# Patient Record
Sex: FEMALE | Race: WHITE | NOT HISPANIC OR LATINO | Employment: FULL TIME | URBAN - METROPOLITAN AREA
[De-identification: names, ages, dates, MRNs, and addresses within clinical notes are randomized per-mention and may not be internally consistent; named-entity substitution may affect disease eponyms.]

---

## 2018-01-03 ENCOUNTER — GYNECOLOGY VISIT (OUTPATIENT)
Dept: OBGYN | Facility: CLINIC | Age: 53
End: 2018-01-03
Payer: COMMERCIAL

## 2018-01-03 VITALS
WEIGHT: 195 LBS | DIASTOLIC BLOOD PRESSURE: 100 MMHG | BODY MASS INDEX: 30.61 KG/M2 | SYSTOLIC BLOOD PRESSURE: 150 MMHG | HEIGHT: 67 IN

## 2018-01-03 DIAGNOSIS — Z30.432 ENCOUNTER FOR IUD REMOVAL: ICD-10-CM

## 2018-01-03 DIAGNOSIS — Z01.419 WELL WOMAN EXAM: ICD-10-CM

## 2018-01-03 PROCEDURE — 99201 PR OFFICE/OUTPT VISIT,NEW,LEVL I: CPT | Performed by: NURSE PRACTITIONER

## 2018-01-03 RX ORDER — LEVOTHYROXINE SODIUM 0.07 MG/1
100 TABLET ORAL
COMMUNITY

## 2018-01-03 RX ORDER — PRAVASTATIN SODIUM 10 MG
80 TABLET ORAL NIGHTLY
COMMUNITY

## 2018-01-03 RX ORDER — LEVOTHYROXINE SODIUM 0.03 MG/1
5 TABLET ORAL
COMMUNITY

## 2018-01-03 RX ORDER — ACYCLOVIR 800 MG/1
800 TABLET ORAL DAILY
COMMUNITY

## 2018-01-04 NOTE — PROGRESS NOTES
Gyn visit today [facility] [address]    S) Patient presents for IUD removal today. States has been in 20 years, doesn't remember what kind. Is states fully menopausal and does not need replacement contraception. States last pap 10 years ago. States never had a mammogram. Does not want additional well woman care at this time. States no hx of abnormal paps. States being followed by Dr. Cotto for thyroid, dyslipidemia. States does not normallly have elevated BP  O) /100. Cervix - strings clearly visualized. No other PE done  A) IUD in situ with strings clearly visualized  P) gyn referral placed for preauth from [facility]. Patient consented for procedure, risks/benefits of removal. I was not able to convince her about other well woman care today except for successfully convincing her of need for mammo, which was ordered. She has never had one. Return for removal.

## 2018-01-15 ENCOUNTER — HOSPITAL ENCOUNTER (OUTPATIENT)
Dept: RADIOLOGY | Facility: MEDICAL CENTER | Age: 53
End: 2018-01-15
Attending: NURSE PRACTITIONER
Payer: COMMERCIAL

## 2018-01-15 DIAGNOSIS — Z01.419 WELL WOMAN EXAM: ICD-10-CM

## 2018-01-15 PROCEDURE — 77067 SCR MAMMO BI INCL CAD: CPT

## 2018-01-19 DIAGNOSIS — R92.8 ABNORMAL MAMMOGRAM: Primary | ICD-10-CM

## 2018-01-23 ENCOUNTER — HOSPITAL ENCOUNTER (OUTPATIENT)
Dept: RADIOLOGY | Facility: MEDICAL CENTER | Age: 53
End: 2018-01-23
Attending: NURSE PRACTITIONER
Payer: COMMERCIAL

## 2018-01-23 DIAGNOSIS — R92.8 ABNORMAL MAMMOGRAM OF LEFT BREAST: ICD-10-CM

## 2018-01-23 PROCEDURE — 77065 DX MAMMO INCL CAD UNI: CPT | Mod: LT

## 2018-02-20 ENCOUNTER — HOSPITAL ENCOUNTER (OUTPATIENT)
Facility: MEDICAL CENTER | Age: 53
End: 2018-02-20
Attending: OBSTETRICS & GYNECOLOGY
Payer: COMMERCIAL

## 2018-02-20 ENCOUNTER — GYNECOLOGY VISIT (OUTPATIENT)
Dept: OBGYN | Facility: CLINIC | Age: 53
End: 2018-02-20
Payer: COMMERCIAL

## 2018-02-20 VITALS — HEIGHT: 67 IN | DIASTOLIC BLOOD PRESSURE: 74 MMHG | SYSTOLIC BLOOD PRESSURE: 120 MMHG

## 2018-02-20 DIAGNOSIS — Z11.51 SCREENING FOR HPV (HUMAN PAPILLOMAVIRUS): ICD-10-CM

## 2018-02-20 DIAGNOSIS — Z12.4 SCREENING FOR MALIGNANT NEOPLASM OF CERVIX: ICD-10-CM

## 2018-02-20 DIAGNOSIS — Z30.432 ENCOUNTER FOR IUD REMOVAL: ICD-10-CM

## 2018-02-20 PROCEDURE — 58301 REMOVE INTRAUTERINE DEVICE: CPT | Performed by: OBSTETRICS & GYNECOLOGY

## 2018-02-20 PROCEDURE — 88175 CYTOPATH C/V AUTO FLUID REDO: CPT

## 2018-02-20 PROCEDURE — 99396 PREV VISIT EST AGE 40-64: CPT | Mod: 25 | Performed by: OBSTETRICS & GYNECOLOGY

## 2018-02-20 PROCEDURE — 87624 HPV HI-RISK TYP POOLED RSLT: CPT

## 2018-02-20 ASSESSMENT — ENCOUNTER SYMPTOMS
HEMOPTYSIS: 0
PALPITATIONS: 0
VOMITING: 0
NAUSEA: 0
CHILLS: 0
ORTHOPNEA: 0
COUGH: 0
HEARTBURN: 0
FEVER: 0
DEPRESSION: 0
DIZZINESS: 0

## 2018-02-20 NOTE — PROGRESS NOTES
"Subjective:      So Wood is a 52 y.o. female who presents with Procedure  IUD removal          HPI    Review of Systems   Constitutional: Negative for chills and fever.   Respiratory: Negative for cough and hemoptysis.    Cardiovascular: Negative for chest pain, palpitations and orthopnea.   Gastrointestinal: Negative for heartburn, nausea and vomiting.   Genitourinary: Negative for dysuria and urgency.   Skin: Negative for rash.   Neurological: Negative for dizziness.   Psychiatric/Behavioral: Negative for depression.          Objective:     /74   Ht 1.702 m (5' 7\")   Breastfeeding? No      Physical Exam   Constitutional: She is oriented to person, place, and time. She appears well-developed and well-nourished.   Neck: Normal range of motion. Neck supple.   Cardiovascular: Normal rate.    Abdominal: Soft. Bowel sounds are normal.   Genitourinary: Rectal exam shows no external hemorrhoid. Pelvic exam was performed with patient supine. No labial fusion. There is no rash, tenderness, lesion or injury on the right labia. There is no rash, tenderness, lesion or injury on the left labia. Uterus is not deviated, not enlarged, not fixed and not tender. Cervix exhibits no motion tenderness, no discharge and no friability. Right adnexum displays no mass, no tenderness and no fullness. Left adnexum displays no mass, no tenderness and no fullness. No tenderness in the vagina. No signs of injury around the vagina. No vaginal discharge found.   Genitourinary Comments: IUD string was clearly noted at cervical os   Lymphadenopathy:        Right: No inguinal adenopathy present.        Left: No inguinal adenopathy present.   Neurological: She is alert and oriented to person, place, and time.   Skin: Skin is warm and dry.   Psychiatric: She has a normal mood and affect.   Nursing note and vitals reviewed.         Discussed with patient risks of IUD removal which can include infection and pain  Patient voiced " understanding of risks as described, informed consent is obtained  IUD string was noted at cervical os and grasped with ring forceps  IUD was removed in its entirety showed the patient and discarded  Patient tolerated procedure well     Assessment/Plan:     1. Encounter for IUD removal    - Consent for Surgery / Procedure    2. Screening for HPV (human papillomavirus)    - THINPREP PAP WITH HPV; Future    3. Screening for malignant neoplasm of cervix    - THINPREP PAP WITH HPV; Future

## 2018-02-21 DIAGNOSIS — Z11.51 SCREENING FOR HPV (HUMAN PAPILLOMAVIRUS): ICD-10-CM

## 2018-02-21 DIAGNOSIS — Z12.4 SCREENING FOR MALIGNANT NEOPLASM OF CERVIX: ICD-10-CM

## 2018-02-22 LAB
CYTOLOGY REG CYTOL: NORMAL
HPV HR 12 DNA CVX QL NAA+PROBE: NEGATIVE
HPV16 DNA SPEC QL NAA+PROBE: NEGATIVE
HPV18 DNA SPEC QL NAA+PROBE: NEGATIVE
SPECIMEN SOURCE: NORMAL

## 2018-07-18 ENCOUNTER — HOSPITAL ENCOUNTER (OUTPATIENT)
Dept: RADIOLOGY | Facility: MEDICAL CENTER | Age: 53
End: 2018-07-18
Attending: NURSE PRACTITIONER
Payer: COMMERCIAL

## 2018-07-18 DIAGNOSIS — R92.8 ABNORMAL FINDINGS ON DIAGNOSTIC IMAGING OF BREAST: ICD-10-CM

## 2018-07-18 PROCEDURE — G0279 TOMOSYNTHESIS, MAMMO: HCPCS | Mod: LT

## 2018-08-10 ENCOUNTER — HOSPITAL ENCOUNTER (OUTPATIENT)
Dept: RADIOLOGY | Facility: MEDICAL CENTER | Age: 53
End: 2018-08-10
Attending: NURSE PRACTITIONER
Payer: COMMERCIAL

## 2018-08-10 DIAGNOSIS — R92.8 ABNORMAL FINDINGS ON DIAGNOSTIC IMAGING OF BREAST: ICD-10-CM

## 2018-08-10 PROCEDURE — 19081 BX BREAST 1ST LESION STRTCTC: CPT

## 2018-08-10 PROCEDURE — 88305 TISSUE EXAM BY PATHOLOGIST: CPT

## 2018-08-13 ENCOUNTER — TELEPHONE (OUTPATIENT)
Dept: RADIOLOGY | Facility: MEDICAL CENTER | Age: 53
End: 2018-08-13

## 2022-08-17 ENCOUNTER — PRE-ADMISSION TESTING (OUTPATIENT)
Dept: ADMISSIONS | Facility: MEDICAL CENTER | Age: 57
DRG: 743 | End: 2022-08-17
Attending: OBSTETRICS & GYNECOLOGY
Payer: COMMERCIAL

## 2022-08-17 DIAGNOSIS — Z01.810 PRE-OPERATIVE CARDIOVASCULAR EXAMINATION: ICD-10-CM

## 2022-08-17 DIAGNOSIS — Z01.812 PRE-OPERATIVE LABORATORY EXAMINATION: ICD-10-CM

## 2022-08-17 LAB
ANION GAP SERPL CALC-SCNC: 10 MMOL/L (ref 7–16)
APPEARANCE UR: CLEAR
BACTERIA #/AREA URNS HPF: NEGATIVE /HPF
BASOPHILS # BLD AUTO: 1.1 % (ref 0–1.8)
BASOPHILS # BLD: 0.05 K/UL (ref 0–0.12)
BILIRUB UR QL STRIP.AUTO: NEGATIVE
BUN SERPL-MCNC: 16 MG/DL (ref 8–22)
CALCIUM SERPL-MCNC: 9.4 MG/DL (ref 8.5–10.5)
CHLORIDE SERPL-SCNC: 102 MMOL/L (ref 96–112)
CO2 SERPL-SCNC: 28 MMOL/L (ref 20–33)
COLOR UR: YELLOW
CREAT SERPL-MCNC: 1.03 MG/DL (ref 0.5–1.4)
EKG IMPRESSION: NORMAL
EOSINOPHIL # BLD AUTO: 0.11 K/UL (ref 0–0.51)
EOSINOPHIL NFR BLD: 2.5 % (ref 0–6.9)
EPI CELLS #/AREA URNS HPF: NEGATIVE /HPF
ERYTHROCYTE [DISTWIDTH] IN BLOOD BY AUTOMATED COUNT: 40.7 FL (ref 35.9–50)
GFR SERPLBLD CREATININE-BSD FMLA CKD-EPI: 63 ML/MIN/1.73 M 2
GLUCOSE SERPL-MCNC: 120 MG/DL (ref 65–99)
GLUCOSE UR STRIP.AUTO-MCNC: NEGATIVE MG/DL
HCT VFR BLD AUTO: 40.4 % (ref 37–47)
HGB BLD-MCNC: 13.8 G/DL (ref 12–16)
HYALINE CASTS #/AREA URNS LPF: NORMAL /LPF
IMM GRANULOCYTES # BLD AUTO: 0.02 K/UL (ref 0–0.11)
IMM GRANULOCYTES NFR BLD AUTO: 0.5 % (ref 0–0.9)
KETONES UR STRIP.AUTO-MCNC: NEGATIVE MG/DL
LEUKOCYTE ESTERASE UR QL STRIP.AUTO: ABNORMAL
LYMPHOCYTES # BLD AUTO: 1.62 K/UL (ref 1–4.8)
LYMPHOCYTES NFR BLD: 36.9 % (ref 22–41)
MCH RBC QN AUTO: 30.3 PG (ref 27–33)
MCHC RBC AUTO-ENTMCNC: 34.2 G/DL (ref 33.6–35)
MCV RBC AUTO: 88.8 FL (ref 81.4–97.8)
MICRO URNS: ABNORMAL
MONOCYTES # BLD AUTO: 0.44 K/UL (ref 0–0.85)
MONOCYTES NFR BLD AUTO: 10 % (ref 0–13.4)
NEUTROPHILS # BLD AUTO: 2.15 K/UL (ref 2–7.15)
NEUTROPHILS NFR BLD: 49 % (ref 44–72)
NITRITE UR QL STRIP.AUTO: NEGATIVE
NRBC # BLD AUTO: 0 K/UL
NRBC BLD-RTO: 0 /100 WBC
PH UR STRIP.AUTO: 6.5 [PH] (ref 5–8)
PLATELET # BLD AUTO: 193 K/UL (ref 164–446)
PMV BLD AUTO: 10.9 FL (ref 9–12.9)
POTASSIUM SERPL-SCNC: 3.4 MMOL/L (ref 3.6–5.5)
PROT UR QL STRIP: NEGATIVE MG/DL
RBC # BLD AUTO: 4.55 M/UL (ref 4.2–5.4)
RBC # URNS HPF: NORMAL /HPF
RBC UR QL AUTO: NEGATIVE
SODIUM SERPL-SCNC: 140 MMOL/L (ref 135–145)
SP GR UR STRIP.AUTO: 1.01
UROBILINOGEN UR STRIP.AUTO-MCNC: 0.2 MG/DL
WBC # BLD AUTO: 4.4 K/UL (ref 4.8–10.8)
WBC #/AREA URNS HPF: NORMAL /HPF

## 2022-08-17 PROCEDURE — 80048 BASIC METABOLIC PNL TOTAL CA: CPT

## 2022-08-17 PROCEDURE — 36415 COLL VENOUS BLD VENIPUNCTURE: CPT

## 2022-08-17 PROCEDURE — 93005 ELECTROCARDIOGRAM TRACING: CPT

## 2022-08-17 PROCEDURE — 93010 ELECTROCARDIOGRAM REPORT: CPT | Performed by: INTERNAL MEDICINE

## 2022-08-17 PROCEDURE — 81001 URINALYSIS AUTO W/SCOPE: CPT

## 2022-08-17 PROCEDURE — 85025 COMPLETE CBC W/AUTO DIFF WBC: CPT

## 2022-08-17 RX ORDER — PROGESTERONE 100 MG/1
100 CAPSULE ORAL
COMMUNITY

## 2022-08-17 RX ORDER — LOSARTAN POTASSIUM 100 MG/1
100 TABLET ORAL DAILY
COMMUNITY

## 2022-08-22 ASSESSMENT — ENCOUNTER SYMPTOMS
RESPIRATORY NEGATIVE: 1
GASTROINTESTINAL NEGATIVE: 1
CARDIOVASCULAR NEGATIVE: 1
CONSTITUTIONAL NEGATIVE: 1

## 2022-09-20 ENCOUNTER — PRE-ADMISSION TESTING (OUTPATIENT)
Dept: ADMISSIONS | Facility: MEDICAL CENTER | Age: 57
DRG: 743 | End: 2022-09-20
Attending: OBSTETRICS & GYNECOLOGY
Payer: COMMERCIAL

## 2022-10-03 NOTE — H&P
Obstetrics & Gynecology History & Physical Note         Date of Service    2022         Chief Complaint    Scheduled for TLH BSO possible lysis of adhesions, posible cystectomy,treatment of endometrisois and all other indictaed procedures.here for pre-op          History of Presenting Illness    So Wood is a 56 y.o.   PMB.      She is being admitted for rescheduled surgery.    Scheduled for TLH BSO possible lysis of adhesions, posible cystectomy,treatment of endometrisois and all other indictaed procedures.         There are no problems to display for this patient.              Obstetric History                        OB History            Para     Term          AB     Living       4     1                 2             SAB     IAB     Ectopic     Molar     Multiple     Live Births             2                       1               #     Outcome     Date     GA     Lbr Chuck/2nd     Weight     Sex     Delivery     Anes     PTL     Lv       4     Para                                                             3     IAB                                                             2     IAB                                                             1                                        Vag-Spont                                   Gynecologic History    Menopausal On and OFF PMB.         Review of Systems    Review of Systems     Constitutional: Negative.      Respiratory: Negative.       Cardiovascular: Negative.      Gastrointestinal: Negative.           Medical History     has a past medical history of Arthritis (2022), High cholesterol, Hyperlipidemia, Hypertension, Sleep apnea, and Thyroid disease.         She has no past medical history of Blood transfusion without reported diagnosis.         Surgical History     has a past surgical history that includes us-needle core bx-breast panel.          Family History    family history includes Hyperlipidemia in her  father and mother; Hypertension in her father and mother; Stroke in her mother.          Social History     reports that she quit smoking about 26 years ago. Her smoking use included cigarettes. She has never used smokeless tobacco. She reports current alcohol use. She reports that she does not use drugs.         Allergies    No Known Allergies         Medications      Cannot display prior to admission medications because the patient has not been admitted in this contact.                 Physical Exam    Vitals                                                                              General:       no acute distress, alert, cooperative, no distress       Skin:       normal       HEENT:      PERRLA and extraocular movements intact       Lungs:       CTA bilateral, clear to auscultation bilaterally       Heart:       S1, S2 normal, no murmur, click, rub or gallop, regular rate and rhythm, regular rate and rhythm       Breasts:       self-exam is taught and encouraged, deferred       Abdomen:      Abdomen soft, non-tender; gravid., Abdomen soft, non-tender. BS normal. No masses,  No organomegaly       Pelvis:     Exam deferred.                                                                                                                                                                        Laboratory:    OB Results                                                                                                                                                                                                                                                                                                                                                                                                                                                                                                                                                                                                                                                                                                                                                                                                                                                                                                                                                                                                                                                                                                                                                                                                                                                                                                                                                                                                                                                                                                                                                                                                        Assessment:     57 y/o G 3P 1021           x 1          adopted another son.         PMB /PCB. on and off.          pelvic pain and discomfort on and off.         pelvic ultrasound- multiple intramural fibroids.          chocolate cyst on ovary 3.5cms. postmenopausal.          EMB- lat[place]           discussed options for AUB / Fibroids and possible endometriosis.          TLH and BSO lysis of adhesions and treatment of endometriosis.          pt wants to go ahed with surgery          explained the procedure and risks and benefits,          no gaurantee of pain relief after surgery.          Plan:    Scheduled for TLH BSO possible lysis of adhesions, posible cystectomy,treatment of endometrisois and all other indictaed procedures.         Discussed with the patient indications for TLH and BSO  . The patient voiced understanding of indications for surgery  at this time.     Discussed with the patient the risks of Surgery . The risks include infection, bleeding, scarring, damage to other organs  in the area of operation. Specifically organs that can be damaged are bowel, bladder, ureters. I also discussed with the patient the risk of wound infection and wound breakdown. We discussed that these risks are greater in people with high risk factors I also discussed the risk of emergency blood transfusion during procedure .     Patient had the opportunity to ask questions regarding procedures. All questions answered to the patient's satisfaction.     Proceed with Surgery      Preopinstructions and post op recovery explained.

## 2022-10-11 ENCOUNTER — ANESTHESIA EVENT (OUTPATIENT)
Dept: SURGERY | Facility: MEDICAL CENTER | Age: 57
DRG: 743 | End: 2022-10-11
Payer: COMMERCIAL

## 2022-10-12 ENCOUNTER — HOSPITAL ENCOUNTER (INPATIENT)
Facility: MEDICAL CENTER | Age: 57
LOS: 1 days | DRG: 743 | End: 2022-10-13
Attending: OBSTETRICS & GYNECOLOGY | Admitting: OBSTETRICS & GYNECOLOGY
Payer: COMMERCIAL

## 2022-10-12 ENCOUNTER — ANESTHESIA (OUTPATIENT)
Dept: SURGERY | Facility: MEDICAL CENTER | Age: 57
DRG: 743 | End: 2022-10-12
Payer: COMMERCIAL

## 2022-10-12 DIAGNOSIS — Z98.890 POST-OPERATIVE STATE: ICD-10-CM

## 2022-10-12 PROCEDURE — 700102 HCHG RX REV CODE 250 W/ 637 OVERRIDE(OP): Performed by: STUDENT IN AN ORGANIZED HEALTH CARE EDUCATION/TRAINING PROGRAM

## 2022-10-12 PROCEDURE — 160041 HCHG SURGERY MINUTES - EA ADDL 1 MIN LEVEL 4: Performed by: OBSTETRICS & GYNECOLOGY

## 2022-10-12 PROCEDURE — 302131 K PAD MOTOR: Performed by: OBSTETRICS & GYNECOLOGY

## 2022-10-12 PROCEDURE — 110371 HCHG SHELL REV 272: Performed by: OBSTETRICS & GYNECOLOGY

## 2022-10-12 PROCEDURE — 0UT94ZZ RESECTION OF UTERUS, PERCUTANEOUS ENDOSCOPIC APPROACH: ICD-10-PCS | Performed by: OBSTETRICS & GYNECOLOGY

## 2022-10-12 PROCEDURE — 0UT24ZZ RESECTION OF BILATERAL OVARIES, PERCUTANEOUS ENDOSCOPIC APPROACH: ICD-10-PCS | Performed by: OBSTETRICS & GYNECOLOGY

## 2022-10-12 PROCEDURE — 302152 K-PAD 12X17: Performed by: OBSTETRICS & GYNECOLOGY

## 2022-10-12 PROCEDURE — A9270 NON-COVERED ITEM OR SERVICE: HCPCS | Performed by: OBSTETRICS & GYNECOLOGY

## 2022-10-12 PROCEDURE — 700101 HCHG RX REV CODE 250: Performed by: STUDENT IN AN ORGANIZED HEALTH CARE EDUCATION/TRAINING PROGRAM

## 2022-10-12 PROCEDURE — 160002 HCHG RECOVERY MINUTES (STAT): Performed by: OBSTETRICS & GYNECOLOGY

## 2022-10-12 PROCEDURE — 700105 HCHG RX REV CODE 258: Performed by: OBSTETRICS & GYNECOLOGY

## 2022-10-12 PROCEDURE — 700111 HCHG RX REV CODE 636 W/ 250 OVERRIDE (IP): Performed by: STUDENT IN AN ORGANIZED HEALTH CARE EDUCATION/TRAINING PROGRAM

## 2022-10-12 PROCEDURE — 160009 HCHG ANES TIME/MIN: Performed by: OBSTETRICS & GYNECOLOGY

## 2022-10-12 PROCEDURE — 160035 HCHG PACU - 1ST 60 MINS PHASE I: Performed by: OBSTETRICS & GYNECOLOGY

## 2022-10-12 PROCEDURE — 160029 HCHG SURGERY MINUTES - 1ST 30 MINS LEVEL 4: Performed by: OBSTETRICS & GYNECOLOGY

## 2022-10-12 PROCEDURE — 00840 ANES IPER PX LOWER ABD NOS: CPT | Performed by: STUDENT IN AN ORGANIZED HEALTH CARE EDUCATION/TRAINING PROGRAM

## 2022-10-12 PROCEDURE — 88307 TISSUE EXAM BY PATHOLOGIST: CPT

## 2022-10-12 PROCEDURE — 0UT74ZZ RESECTION OF BILATERAL FALLOPIAN TUBES, PERCUTANEOUS ENDOSCOPIC APPROACH: ICD-10-PCS | Performed by: OBSTETRICS & GYNECOLOGY

## 2022-10-12 PROCEDURE — 160036 HCHG PACU - EA ADDL 30 MINS PHASE I: Performed by: OBSTETRICS & GYNECOLOGY

## 2022-10-12 PROCEDURE — 700102 HCHG RX REV CODE 250 W/ 637 OVERRIDE(OP): Performed by: OBSTETRICS & GYNECOLOGY

## 2022-10-12 PROCEDURE — 160048 HCHG OR STATISTICAL LEVEL 1-5: Performed by: OBSTETRICS & GYNECOLOGY

## 2022-10-12 PROCEDURE — A9270 NON-COVERED ITEM OR SERVICE: HCPCS | Performed by: STUDENT IN AN ORGANIZED HEALTH CARE EDUCATION/TRAINING PROGRAM

## 2022-10-12 PROCEDURE — 770001 HCHG ROOM/CARE - MED/SURG/GYN PRIV*

## 2022-10-12 RX ORDER — ACETAMINOPHEN 500 MG
TABLET ORAL
Status: COMPLETED
Start: 2022-10-12 | End: 2022-10-12

## 2022-10-12 RX ORDER — DEXMEDETOMIDINE HYDROCHLORIDE 100 UG/ML
INJECTION, SOLUTION INTRAVENOUS PRN
Status: DISCONTINUED | OUTPATIENT
Start: 2022-10-12 | End: 2022-10-12 | Stop reason: SURG

## 2022-10-12 RX ORDER — POLYETHYLENE GLYCOL 3350 17 G/17G
1 POWDER, FOR SOLUTION ORAL 2 TIMES DAILY PRN
Status: DISCONTINUED | OUTPATIENT
Start: 2022-10-12 | End: 2022-10-13 | Stop reason: HOSPADM

## 2022-10-12 RX ORDER — HYDROMORPHONE HYDROCHLORIDE 2 MG/ML
INJECTION, SOLUTION INTRAMUSCULAR; INTRAVENOUS; SUBCUTANEOUS PRN
Status: DISCONTINUED | OUTPATIENT
Start: 2022-10-12 | End: 2022-10-12 | Stop reason: SURG

## 2022-10-12 RX ORDER — KETOROLAC TROMETHAMINE 30 MG/ML
INJECTION, SOLUTION INTRAMUSCULAR; INTRAVENOUS PRN
Status: DISCONTINUED | OUTPATIENT
Start: 2022-10-12 | End: 2022-10-12 | Stop reason: SURG

## 2022-10-12 RX ORDER — AMOXICILLIN 250 MG
1 CAPSULE ORAL
Status: DISCONTINUED | OUTPATIENT
Start: 2022-10-12 | End: 2022-10-13 | Stop reason: HOSPADM

## 2022-10-12 RX ORDER — SCOLOPAMINE TRANSDERMAL SYSTEM 1 MG/1
PATCH, EXTENDED RELEASE TRANSDERMAL PRN
Status: DISCONTINUED | OUTPATIENT
Start: 2022-10-12 | End: 2022-10-12 | Stop reason: SURG

## 2022-10-12 RX ORDER — HYDROMORPHONE HYDROCHLORIDE 1 MG/ML
0.4 INJECTION, SOLUTION INTRAMUSCULAR; INTRAVENOUS; SUBCUTANEOUS
Status: DISCONTINUED | OUTPATIENT
Start: 2022-10-12 | End: 2022-10-12 | Stop reason: HOSPADM

## 2022-10-12 RX ORDER — ONDANSETRON 2 MG/ML
4 INJECTION INTRAMUSCULAR; INTRAVENOUS EVERY 4 HOURS PRN
Status: DISCONTINUED | OUTPATIENT
Start: 2022-10-12 | End: 2022-10-13 | Stop reason: HOSPADM

## 2022-10-12 RX ORDER — OXYCODONE HYDROCHLORIDE 5 MG/1
10 TABLET ORAL
Status: DISCONTINUED | OUTPATIENT
Start: 2022-10-12 | End: 2022-10-13 | Stop reason: HOSPADM

## 2022-10-12 RX ORDER — HYDROMORPHONE HYDROCHLORIDE 1 MG/ML
0.1 INJECTION, SOLUTION INTRAMUSCULAR; INTRAVENOUS; SUBCUTANEOUS
Status: DISCONTINUED | OUTPATIENT
Start: 2022-10-12 | End: 2022-10-12 | Stop reason: HOSPADM

## 2022-10-12 RX ORDER — ONDANSETRON 2 MG/ML
4 INJECTION INTRAMUSCULAR; INTRAVENOUS
Status: COMPLETED | OUTPATIENT
Start: 2022-10-12 | End: 2022-10-12

## 2022-10-12 RX ORDER — DOCUSATE SODIUM 100 MG/1
100 CAPSULE, LIQUID FILLED ORAL 2 TIMES DAILY
Status: DISCONTINUED | OUTPATIENT
Start: 2022-10-12 | End: 2022-10-13 | Stop reason: HOSPADM

## 2022-10-12 RX ORDER — ONDANSETRON 2 MG/ML
INJECTION INTRAMUSCULAR; INTRAVENOUS PRN
Status: DISCONTINUED | OUTPATIENT
Start: 2022-10-12 | End: 2022-10-12 | Stop reason: SURG

## 2022-10-12 RX ORDER — ACETAMINOPHEN 500 MG
1000 TABLET ORAL EVERY 6 HOURS
Status: DISCONTINUED | OUTPATIENT
Start: 2022-10-12 | End: 2022-10-13 | Stop reason: HOSPADM

## 2022-10-12 RX ORDER — ENEMA 19; 7 G/133ML; G/133ML
1 ENEMA RECTAL
Status: DISCONTINUED | OUTPATIENT
Start: 2022-10-12 | End: 2022-10-13 | Stop reason: HOSPADM

## 2022-10-12 RX ORDER — IBUPROFEN 800 MG/1
800 TABLET ORAL 3 TIMES DAILY PRN
Status: DISCONTINUED | OUTPATIENT
Start: 2022-10-17 | End: 2022-10-13 | Stop reason: HOSPADM

## 2022-10-12 RX ORDER — ROCURONIUM BROMIDE 10 MG/ML
INJECTION, SOLUTION INTRAVENOUS PRN
Status: DISCONTINUED | OUTPATIENT
Start: 2022-10-12 | End: 2022-10-12 | Stop reason: SURG

## 2022-10-12 RX ORDER — HALOPERIDOL 5 MG/ML
1 INJECTION INTRAMUSCULAR
Status: DISCONTINUED | OUTPATIENT
Start: 2022-10-12 | End: 2022-10-12 | Stop reason: HOSPADM

## 2022-10-12 RX ORDER — ACETAMINOPHEN 500 MG
1000 TABLET ORAL EVERY 6 HOURS PRN
Status: DISCONTINUED | OUTPATIENT
Start: 2022-10-17 | End: 2022-10-13 | Stop reason: HOSPADM

## 2022-10-12 RX ORDER — OXYCODONE HCL 5 MG/5 ML
10 SOLUTION, ORAL ORAL
Status: DISCONTINUED | OUTPATIENT
Start: 2022-10-12 | End: 2022-10-12 | Stop reason: HOSPADM

## 2022-10-12 RX ORDER — SODIUM CHLORIDE, SODIUM LACTATE, POTASSIUM CHLORIDE, CALCIUM CHLORIDE 600; 310; 30; 20 MG/100ML; MG/100ML; MG/100ML; MG/100ML
INJECTION, SOLUTION INTRAVENOUS CONTINUOUS
Status: ACTIVE | OUTPATIENT
Start: 2022-10-12 | End: 2022-10-12

## 2022-10-12 RX ORDER — IBUPROFEN 800 MG/1
800 TABLET ORAL 3 TIMES DAILY
Status: DISCONTINUED | OUTPATIENT
Start: 2022-10-12 | End: 2022-10-13 | Stop reason: HOSPADM

## 2022-10-12 RX ORDER — SCOLOPAMINE TRANSDERMAL SYSTEM 1 MG/1
PATCH, EXTENDED RELEASE TRANSDERMAL
Status: COMPLETED
Start: 2022-10-12 | End: 2022-10-12

## 2022-10-12 RX ORDER — OXYCODONE HYDROCHLORIDE 5 MG/1
5 TABLET ORAL
Status: DISCONTINUED | OUTPATIENT
Start: 2022-10-12 | End: 2022-10-13 | Stop reason: HOSPADM

## 2022-10-12 RX ORDER — LIDOCAINE HYDROCHLORIDE 40 MG/ML
SOLUTION TOPICAL PRN
Status: DISCONTINUED | OUTPATIENT
Start: 2022-10-12 | End: 2022-10-12 | Stop reason: SURG

## 2022-10-12 RX ORDER — OXYCODONE HCL 5 MG/5 ML
5 SOLUTION, ORAL ORAL
Status: DISCONTINUED | OUTPATIENT
Start: 2022-10-12 | End: 2022-10-12 | Stop reason: HOSPADM

## 2022-10-12 RX ORDER — AMOXICILLIN 250 MG
1 CAPSULE ORAL NIGHTLY
Status: DISCONTINUED | OUTPATIENT
Start: 2022-10-12 | End: 2022-10-13 | Stop reason: HOSPADM

## 2022-10-12 RX ORDER — CEFOTETAN DISODIUM 2 G/20ML
INJECTION, POWDER, FOR SOLUTION INTRAMUSCULAR; INTRAVENOUS PRN
Status: DISCONTINUED | OUTPATIENT
Start: 2022-10-12 | End: 2022-10-12 | Stop reason: SURG

## 2022-10-12 RX ORDER — DEXAMETHASONE SODIUM PHOSPHATE 4 MG/ML
4 INJECTION, SOLUTION INTRA-ARTICULAR; INTRALESIONAL; INTRAMUSCULAR; INTRAVENOUS; SOFT TISSUE
Status: DISCONTINUED | OUTPATIENT
Start: 2022-10-12 | End: 2022-10-13 | Stop reason: HOSPADM

## 2022-10-12 RX ORDER — HYDROMORPHONE HYDROCHLORIDE 1 MG/ML
0.2 INJECTION, SOLUTION INTRAMUSCULAR; INTRAVENOUS; SUBCUTANEOUS
Status: DISCONTINUED | OUTPATIENT
Start: 2022-10-12 | End: 2022-10-12 | Stop reason: HOSPADM

## 2022-10-12 RX ORDER — ACETAMINOPHEN 325 MG/1
TABLET ORAL PRN
Status: DISCONTINUED | OUTPATIENT
Start: 2022-10-12 | End: 2022-10-12 | Stop reason: SURG

## 2022-10-12 RX ORDER — OXYCODONE HYDROCHLORIDE AND ACETAMINOPHEN 5; 325 MG/1; MG/1
1 TABLET ORAL EVERY 4 HOURS PRN
Qty: 15 TABLET | Refills: 0 | Status: SHIPPED | OUTPATIENT
Start: 2022-10-12 | End: 2022-10-16

## 2022-10-12 RX ORDER — SCOLOPAMINE TRANSDERMAL SYSTEM 1 MG/1
1 PATCH, EXTENDED RELEASE TRANSDERMAL
Status: DISCONTINUED | OUTPATIENT
Start: 2022-10-12 | End: 2022-10-13 | Stop reason: HOSPADM

## 2022-10-12 RX ORDER — DIPHENHYDRAMINE HYDROCHLORIDE 50 MG/ML
25 INJECTION INTRAMUSCULAR; INTRAVENOUS EVERY 6 HOURS PRN
Status: DISCONTINUED | OUTPATIENT
Start: 2022-10-12 | End: 2022-10-13 | Stop reason: HOSPADM

## 2022-10-12 RX ORDER — BISACODYL 10 MG
10 SUPPOSITORY, RECTAL RECTAL
Status: DISCONTINUED | OUTPATIENT
Start: 2022-10-12 | End: 2022-10-13 | Stop reason: HOSPADM

## 2022-10-12 RX ORDER — DIPHENHYDRAMINE HYDROCHLORIDE 50 MG/ML
12.5 INJECTION INTRAMUSCULAR; INTRAVENOUS
Status: DISCONTINUED | OUTPATIENT
Start: 2022-10-12 | End: 2022-10-12 | Stop reason: HOSPADM

## 2022-10-12 RX ORDER — HYDROMORPHONE HYDROCHLORIDE 1 MG/ML
0.5 INJECTION, SOLUTION INTRAMUSCULAR; INTRAVENOUS; SUBCUTANEOUS
Status: DISCONTINUED | OUTPATIENT
Start: 2022-10-12 | End: 2022-10-13 | Stop reason: HOSPADM

## 2022-10-12 RX ORDER — MIDAZOLAM HYDROCHLORIDE 1 MG/ML
INJECTION INTRAMUSCULAR; INTRAVENOUS PRN
Status: DISCONTINUED | OUTPATIENT
Start: 2022-10-12 | End: 2022-10-12 | Stop reason: SURG

## 2022-10-12 RX ORDER — IBUPROFEN 600 MG/1
600 TABLET ORAL EVERY 6 HOURS PRN
Qty: 30 TABLET | Refills: 1 | Status: SHIPPED | OUTPATIENT
Start: 2022-10-12

## 2022-10-12 RX ORDER — HALOPERIDOL 5 MG/ML
1 INJECTION INTRAMUSCULAR EVERY 6 HOURS PRN
Status: DISCONTINUED | OUTPATIENT
Start: 2022-10-12 | End: 2022-10-13 | Stop reason: HOSPADM

## 2022-10-12 RX ORDER — DEXAMETHASONE SODIUM PHOSPHATE 4 MG/ML
INJECTION, SOLUTION INTRA-ARTICULAR; INTRALESIONAL; INTRAMUSCULAR; INTRAVENOUS; SOFT TISSUE PRN
Status: DISCONTINUED | OUTPATIENT
Start: 2022-10-12 | End: 2022-10-12 | Stop reason: SURG

## 2022-10-12 RX ADMIN — IBUPROFEN 800 MG: 800 TABLET, FILM COATED ORAL at 21:30

## 2022-10-12 RX ADMIN — DEXMEDETOMIDINE 20 MCG: 200 INJECTION, SOLUTION INTRAVENOUS at 16:20

## 2022-10-12 RX ADMIN — HYDROMORPHONE HYDROCHLORIDE 0.2 MG: 2 INJECTION INTRAMUSCULAR; INTRAVENOUS; SUBCUTANEOUS at 16:26

## 2022-10-12 RX ADMIN — CEFOTETAN DISODIUM 2 G: 2 INJECTION, POWDER, FOR SOLUTION INTRAMUSCULAR; INTRAVENOUS at 14:52

## 2022-10-12 RX ADMIN — FENTANYL CITRATE 50 MCG: 50 INJECTION, SOLUTION INTRAMUSCULAR; INTRAVENOUS at 16:18

## 2022-10-12 RX ADMIN — DEXAMETHASONE SODIUM PHOSPHATE 8 MG: 4 INJECTION, SOLUTION INTRA-ARTICULAR; INTRALESIONAL; INTRAMUSCULAR; INTRAVENOUS; SOFT TISSUE at 15:08

## 2022-10-12 RX ADMIN — ACETAMINOPHEN 1000 MG: 325 TABLET, FILM COATED ORAL at 14:25

## 2022-10-12 RX ADMIN — SUGAMMADEX 200 MG: 100 INJECTION, SOLUTION INTRAVENOUS at 16:38

## 2022-10-12 RX ADMIN — SODIUM CHLORIDE, POTASSIUM CHLORIDE, SODIUM LACTATE AND CALCIUM CHLORIDE 1000 ML: 600; 310; 30; 20 INJECTION, SOLUTION INTRAVENOUS at 12:17

## 2022-10-12 RX ADMIN — EPHEDRINE SULFATE 15 MG: 50 INJECTION INTRAMUSCULAR; INTRAVENOUS; SUBCUTANEOUS at 16:47

## 2022-10-12 RX ADMIN — LIDOCAINE HYDROCHLORIDE 4 ML: 40 SOLUTION TOPICAL at 14:56

## 2022-10-12 RX ADMIN — SENNOSIDES AND DOCUSATE SODIUM 1 TABLET: 50; 8.6 TABLET ORAL at 21:30

## 2022-10-12 RX ADMIN — HYDROMORPHONE HYDROCHLORIDE 0.4 MG: 2 INJECTION INTRAMUSCULAR; INTRAVENOUS; SUBCUTANEOUS at 16:35

## 2022-10-12 RX ADMIN — ONDANSETRON 4 MG: 2 INJECTION INTRAMUSCULAR; INTRAVENOUS at 18:43

## 2022-10-12 RX ADMIN — DOCUSATE SODIUM 100 MG: 100 CAPSULE, LIQUID FILLED ORAL at 21:30

## 2022-10-12 RX ADMIN — FENTANYL CITRATE 100 MCG: 50 INJECTION, SOLUTION INTRAMUSCULAR; INTRAVENOUS at 14:52

## 2022-10-12 RX ADMIN — HYDROMORPHONE HYDROCHLORIDE 0.4 MG: 2 INJECTION INTRAMUSCULAR; INTRAVENOUS; SUBCUTANEOUS at 16:21

## 2022-10-12 RX ADMIN — MIDAZOLAM HYDROCHLORIDE 2 MG: 1 INJECTION, SOLUTION INTRAMUSCULAR; INTRAVENOUS at 14:47

## 2022-10-12 RX ADMIN — PROPOFOL 170 MG: 10 INJECTION, EMULSION INTRAVENOUS at 14:52

## 2022-10-12 RX ADMIN — KETOROLAC TROMETHAMINE 15 MG: 30 INJECTION, SOLUTION INTRAMUSCULAR at 16:23

## 2022-10-12 RX ADMIN — FENTANYL CITRATE 50 MCG: 50 INJECTION, SOLUTION INTRAMUSCULAR; INTRAVENOUS at 14:55

## 2022-10-12 RX ADMIN — FENTANYL CITRATE 50 MCG: 50 INJECTION, SOLUTION INTRAMUSCULAR; INTRAVENOUS at 15:14

## 2022-10-12 RX ADMIN — ONDANSETRON 4 MG: 2 INJECTION INTRAMUSCULAR; INTRAVENOUS at 16:19

## 2022-10-12 RX ADMIN — SCOPALAMINE 1 PATCH: 1 PATCH, EXTENDED RELEASE TRANSDERMAL at 14:25

## 2022-10-12 RX ADMIN — DEXMEDETOMIDINE 10 MCG: 200 INJECTION, SOLUTION INTRAVENOUS at 16:28

## 2022-10-12 RX ADMIN — ROCURONIUM BROMIDE 70 MG: 10 INJECTION, SOLUTION INTRAVENOUS at 14:52

## 2022-10-12 RX ADMIN — HYDROMORPHONE HYDROCHLORIDE 0.4 MG: 2 INJECTION INTRAMUSCULAR; INTRAVENOUS; SUBCUTANEOUS at 15:10

## 2022-10-12 ASSESSMENT — PAIN DESCRIPTION - PAIN TYPE
TYPE: SURGICAL PAIN

## 2022-10-12 NOTE — ANESTHESIA PROCEDURE NOTES
Airway    Date/Time: 10/12/2022 2:56 PM  Performed by: sIaac Cuadra M.D.  Authorized by: Isaac Cuadra M.D.     Location:  OR  Urgency:  Elective  Indications for Airway Management:  Anesthesia      Spontaneous Ventilation: absent    Sedation Level:  Deep  Preoxygenated: Yes    Patient Position:  Sniffing  Mask Difficulty Assessment:  1 - vent by mask  Final Airway Type:  Endotracheal airway  Final Endotracheal Airway:  ETT  Cuffed: Yes    Technique Used for Successful ETT Placement:  Video laryngoscopy    Insertion Site:  Oral  Blade Type:  Glide  Laryngoscope Blade/Videolaryngoscope Blade Size:  3  ETT Size (mm):  7.0  Measured from:  Teeth  ETT to Teeth (cm):  21  Placement Verified by: capnometry    Cormack-Lehane Classification:  Grade I - full view of glottis  Number of Attempts at Approach:  1  Ventilation Between Attempts:  None  Number of Other Approaches Attempted:  1  Unsuccessful Approach(es) for ETT:  Direct laryngoscopy   Mac 3 Grade III. Switched to [place] 3, Grade I.

## 2022-10-12 NOTE — OR NURSING
1646 -Patient arrived from OR to PACU 2. 2 patient identifiers verified. Bedside report completed by anesthesia and OR RN. Patient requiring 10L via OPA & mask, respirations equal and unlabored. Patient attached to PACU monitors, VSS.     Upon arrival to PACU, pt hypotensive with SBP in the 50's. Anesthesia at bedside, medicated by anesthesia for low blood pressure.     1706-OPA d/c'd    1755-sonPierre, called and updated regarding pt's status    1848-sonPierre, at bedside    1915-pt up to edge of bed and into BR with assistance, able to initiate stream. Emesis x1. Dressed with assistance. Up to recliner. Pt bladder scanned for 41ml in bladder    1935-intermittent desaturations to 80s% while on RA. Pt given and educated on use of incentive spirometer and its importance.    1955-MD Jelani notified of pt desaturating while sleeping on RA. While on RA trial, pt desaturated to 58% while sleeping. Hx of sleep apnea with no cpap use. Unable to keep pt on RA. Pt placed on 3L via NC, plan to admit overnight    2016-report given to MARINA Simmons

## 2022-10-12 NOTE — ANESTHESIA TIME REPORT
Anesthesia Start and Stop Event Times     Date Time Event    10/12/2022 1430 Ready for Procedure     1446 Anesthesia Start     1650 Anesthesia Stop        Responsible Staff  10/12/22    Name Role Begin End    Min NATHAN Cuadra M.D. Anesth 1446 1650        Overtime Reason:  no overtime (within assigned shift)    Comments:

## 2022-10-12 NOTE — ANESTHESIA PREPROCEDURE EVALUATION
Case: [id number] Date/Time: 10/12/22 1315    Procedures:       TOTAL LAPAROSCOPIC HYSTERECTOMY, BILATERAL SALPINGO-OOPHORECTOMY, POSSIBLE CYSTOSCOPY, ALL INDICATED PROCEDURES      SALPINGO-OOPHORECTOMY      CYSTOSCOPY    Anesthesia type: General    Pre-op diagnosis: ABNORMAL UTERINE BLEEDING, UTERINE FIBROID    Location: [facility] ROOM 27 / SURGERY SAME DAY [facility]    Surgeons: Amrita Palomares M.D.      57 yo F w/ hx of asthma, ADELINA, HTN, hypothyroid. NPO. Last dose losartan 2 nights ago. Took synthroid yest. METS >4. Not on AC or BB.    Relevant Problems   No relevant active problems       Physical Exam    Airway   Mallampati: III  TM distance: <3 FB  Neck ROM: full       Cardiovascular - normal exam  Rhythm: regular  Rate: normal  (-) murmur     Dental - normal exam           Pulmonary - normal exam  Breath sounds clear to auscultation     Abdominal    Neurological - normal exam                 Anesthesia Plan    ASA 2       Plan - general       Airway plan will be ETT          Induction: intravenous    Postoperative Plan: Postoperative administration of opioids is intended.    Pertinent diagnostic labs and testing reviewed    Informed Consent:    Anesthetic plan and risks discussed with patient.    Use of blood products discussed with: patient whom consented to blood products.

## 2022-10-12 NOTE — OR SURGEON
Immediate Post OP Note    PreOp Diagnosis: AUB  Fibroid.  endometriosis      PostOp Diagnosis: same.       Procedure(s):  TOTAL LAPAROSCOPIC HYSTERECTOMY, BILATERAL SALPINGO-OOPHORECTOMY, POSSIBLE CYSTOSCOPY, ALL INDICATED PROCEDURES - Wound Class: Clean  SALPINGO-OOPHORECTOMY - Wound Class: Clean  Surgeon(s):  FRANKO Borjas M.D.    Anesthesiologist/Type of Anesthesia:  Anesthesiologist: Isaac Cuadra M.D./General    Surgical Staff:  Circulator: Abiola Grady R.N.  Scrub Person: Edwin Cutler    Specimens removed if any:  ID Type Source Tests Collected by Time Destination   A : Uterus, cervic, bilateral fallopian tubes and bilateral ovaries Other Other PATHOLOGY SPECIMEN Amrita Palomares M.D. 10/12/2022  3:33 PM        Estimated Blood Loss: 100 ml.    Findings: bulky enlarged uterus multiple sub serosal fibroids. Right ovary enlarged with cyst left ovary stuck to lateral pelvic wall.    Complications: none.        10/12/2022 4:30 PM Amrita Palomares M.D.

## 2022-10-12 NOTE — OP REPORT
PreOp Diagnosis: AUB  Fibroid.  endometriosis        PostOp Diagnosis: same.         Procedure(s):  TOTAL LAPAROSCOPIC HYSTERECTOMY, BILATERAL SALPINGO-OOPHORECTOMY, POSSIBLE CYSTOSCOPY, ALL INDICATED PROCEDURES - Wound Class: Clean  SALPINGO-OOPHORECTOMY - Wound Class: Clean  Surgeon(s):  FRANKO Borjas M.D.     Anesthesiologist/Type of Anesthesia:  Anesthesiologist: Isaac Cuadra M.D./General     Surgical Staff:  Circulator: Abiola Grady R.N.  Scrub Person: Edwin Cutler     Specimens removed if any:  ID Type Source Tests Collected by Time Destination   A : Uterus, cervic, bilateral fallopian tubes and bilateral ovaries Other Other PATHOLOGY SPECIMEN Amrita Palomares M.D. 10/12/2022  3:33 PM           Estimated Blood Loss: 100 ml.     Findings: bulky enlarged uterus multiple sub serosal fibroids. Right ovary enlarged with cyst left ovary stuck to lateral pelvic wall.     On assessment of the upper abdomen, liver, spleen, omentum, and peritoneal surfaces of the bowel, were all unremarkable in appearance.    Complications: none.    Total laparoscopic hysterectomy.      PROCEDURE:  The patient was taken to the operating room where   general anesthesia was induced without difficulty.  The patient was then  placed in the dorsal lithotomy position and examined under anesthesia findings as noted above.  Prepped and draped in a sterile fashion.  The bladder was Womack catheterized. A bivalve speculum was then placed in the patient's vagina and the anterior lip of the cervix grasped with a single-tooth tenaculum.cervix was tagged at the 6 and 12’o clock positions with a 0 Vicryl sutures. The cervix was dilated with Hanks dilators and a VCare apparatus was passed into the uterus for uterine manipulation during the planned operative procedure,the suture was fixed on the colpotomy ring on Vcare and ligated.  The speculum was removed from the vagina.      Attention was then turned to the patient's  abdomen after regloving.Infraumbillical skin infiltrated with marcaine with epi, a 10 mm skin incision was made in the umbilical fold.  The Veress needle was carefully introduced into the peritoneal cavity at a 45-degree angle while tenting up  the abdominal wall.  Intraperitoneal placement was confirmed by low pressures peritoneal cavity . Pneumoperitoneum was obtained with 4 L of CO2 gas and a10 MM trocar and sleeve were then advanced without difficulty into the abdomen where intra-abdominal placement was confirmed by the laparoscope the abdomen was explored with the laparoscope with the findings as noted above.  No injury or bleeding at the port site of the entry tract noted.  Patient was changed to Trendelenburg position, the remaining ports were placed under direct laparoscopic guidance 5 mm right and left lower quadrants were placed.    Attention was then turned to the laparoscopic hysterectomy.  The direction and location of both ureters were identified from the pelvic brim to the cardinal ligaments and noted to be clear from the operative field.  THe tube was identified lifted up and the IP ligament was exposed on the right side.  Using the ligasure vessel sealing device the IP ligament  was grasped coagulated and cut the dissection clamping desiccation and transection was carried further along the mesosalpinx parallel to the fallopian tubes reaching the round ligament about 3 cm from the uterine cornu.  Anterior leaf of the broad ligament was lifted up by passing a blade of the vessel sealing device underneath it cut and ligated the cath and advanced towards the bladder reflection . Bladder flap was held ,the bladder pillars were identified was clamped, coagulated and cut . The bladder was pushed well away from the lower uterine segment and at the upper vaginal cuff. The uterine vessels were skeletonized and the uterine arteries were sealed using the vessel saving device at the level of the colpotomy ring.   Using contralateral retraction of the uterus the cardinal ligament uterosacral complex was well delineated and in the ureters was displaced off the complex was clamped coagulated and cut in small segments with good exposure and hemostasis was obtained.    The same procedure was repeated on the left side  side , the left ovary was adherent to the lateral pelvic wall aft ensuring the ureter was away from the filed the peritoneal band was held with ligasure and cut th leaves  out and ovary released the IP lIgament as grasped with ligasure clamped , coagulated and cut, the mesosalpinx was grasped coagulated and cut, the round ligament clamped coagulated and cut  . Antrior leaf of broaad ligament was held up with ligasure coagulated and cut bladder reflection was created further. The uterine vessels skeletonised coaugulated and cut. Uterosacral and cardinal ligament cogulated and cut , the bladder was further mobilized well away from the lower uterine segment and the vaginal fornices on the opposite side.  The vaginal wall is stented using the manipultor and well delineate the fornice .    An anterior colpotomy was made in the J-hook  and extended laterally in both directions and  join posterorly excising the vaginal cuff.  The uterus tubes and cervix was brought out through the incision andsent for pathology glove with raytec  kept in the vagina to maintain the pneumoperitoneum.  Using laparoscopic needle welch with a o Vicryl-strata fix  left cuff angle suture bite was taken posteriorly and then anteriorly flap and pulled down  in place. The nedle was cut and handed to scrub tech. The end of suture was brought out throught the left port and held as stay suture. Then using another 2-0 stratifix bites were taked on right angle of the cuff locked the end and closed the cuff  in a running fashion anterior posteriorly and reach to the left angle after the bite was taken this was locked in and the suture was cut  and the needle was brought out handed over to the surgical tech.   The abdomen was irrigated and fluid was suctioned out, excellent hemostasis was reaffirmed insufflation pressures were in the right use no evidence of bleeding was seen pneumoperitoneum was released the abdominal fold fascia was closed with 0 Vicryl and the umbilical port and all the remaining ports skin was closed with 4-0 Monocryl the final sponge needle and instrument counts were correct x2 at the completion of the procedure    Cystoscopy  Bladder Womack was deflated Womack was removed cystoscope introduction attempted but small urethra noted inspite of dilation to 13 was not able to pass the cystoscope  and terminated the procedure as suspicion for ureteral involvement or bladder injury was low.      The final sponge, needle, and instrument counts were correct at the completion of the procedure. The patient was awakened and taken to the post anesthesia care unit in stable condition.

## 2022-10-13 VITALS
SYSTOLIC BLOOD PRESSURE: 106 MMHG | WEIGHT: 188.93 LBS | RESPIRATION RATE: 18 BRPM | TEMPERATURE: 98 F | HEART RATE: 78 BPM | HEIGHT: 67 IN | OXYGEN SATURATION: 94 % | BODY MASS INDEX: 29.65 KG/M2 | DIASTOLIC BLOOD PRESSURE: 69 MMHG

## 2022-10-13 PROCEDURE — 700102 HCHG RX REV CODE 250 W/ 637 OVERRIDE(OP): Performed by: OBSTETRICS & GYNECOLOGY

## 2022-10-13 PROCEDURE — A9270 NON-COVERED ITEM OR SERVICE: HCPCS | Performed by: OBSTETRICS & GYNECOLOGY

## 2022-10-13 RX ADMIN — ACETAMINOPHEN 1000 MG: 500 TABLET ORAL at 06:02

## 2022-10-13 RX ADMIN — OXYCODONE 5 MG: 5 TABLET ORAL at 02:02

## 2022-10-13 RX ADMIN — DOCUSATE SODIUM 100 MG: 100 CAPSULE, LIQUID FILLED ORAL at 06:02

## 2022-10-13 RX ADMIN — IBUPROFEN 800 MG: 800 TABLET, FILM COATED ORAL at 06:02

## 2022-10-13 RX ADMIN — ACETAMINOPHEN 1000 MG: 500 TABLET ORAL at 00:37

## 2022-10-13 ASSESSMENT — PAIN DESCRIPTION - PAIN TYPE
TYPE: ACUTE PAIN;SURGICAL PAIN
TYPE: SURGICAL PAIN
TYPE: ACUTE PAIN;SURGICAL PAIN

## 2022-10-13 NOTE — OR NURSING
Call received from patient's pharmacy, [facility] regarding questions on day supply for post operative medication of Percocet. This RN called Dr. Palomares to verify day supply ordered. MD states for 5 days. Updates provided to [facility] pharmacy.

## 2022-10-13 NOTE — PROGRESS NOTES
Admitted from PACU a hysterectomy patient in fair condition, placed to bed comfortably. On 3 liters of 02 via nasal cannula. Plan of care on going oriented to room and call light place within reach. Will continue to monitor.

## 2022-10-13 NOTE — PROGRESS NOTES
"So Wood Post- perative  day 1 POD 1    Subjective: Abdominal pain. yes, ambulating .yes, tolerating liquids .yes, tolerating regular diet .yes, flatus.yes, BM .no, Bleeding .no, voiding .yeds ,dizziness .no,  O2 on room air maintained well after over night obeservation on nasal canula, has been wened off it now.     /69   Pulse 78   Temp 36.7 °C (98 °F) (Temporal)   Resp 18   Ht 1.702 m (5' 7\")   Wt 85.7 kg (188 lb 15 oz)   SpO2 93%   Abdomen soft, non-tender. BS normal. No masses,  No organomegaly  Incision: no evidence of infection, separation or keloid formation.    Perineumperineum intact  ExtremitiesNormal    Meds:   No current facility-administered medications on file prior to encounter.     Current Outpatient Medications on File Prior to Encounter   Medication Sig Dispense Refill    acyclovir (ZOVIRAX) 800 MG Tab Take 800 mg by mouth every day.      levothyroxine (SYNTHROID) 75 MCG Tab Take 100 mcg by mouth every morning on an empty stomach.      levothyroxine (SYNTHROID) 25 MCG Tab Take 5 mcg by mouth every morning on an empty stomach.      pravastatin (PRAVACHOL) 10 MG Tab Take 80 mg by mouth every evening.         Lab: No results found for this or any previous visit (from the past 48 hour(s)).    Assessment and Plan  normal postoperative  course  No areas of skin breakdown/redness; surgical incision intact/healing    Continue Routine postoperative  care    D/c home.  "

## 2022-10-13 NOTE — DISCHARGE INSTRUCTIONS
HOME CARE INSTRUCTIONS    ACTIVITY: Rest and take it easy for the first 24 hours.  A responsible adult is recommended to remain with you during that time.  It is normal to feel sleepy.  We encourage you to not do anything that requires balance, judgment or coordination.    FOR 24 HOURS DO NOT:  Drive, operate machinery or run household appliances.  Drink beer or alcoholic beverages.  Make important decisions or sign legal documents.    SPECIAL INSTRUCTIONS: See Handout    DIET: To avoid nausea, slowly advance diet as tolerated, avoiding spicy or greasy foods for the first day.  Add more substantial food to your diet according to your physician's instructions.  Babies can be fed formula or breast milk as soon as they are hungry.  INCREASE FLUIDS AND FIBER TO AVOID CONSTIPATION.    SURGICAL DRESSING/BATHING: See Handout    MEDICATIONS: Resume taking daily medication.  Take prescribed pain medication with food.  If no medication is prescribed, you may take non-aspirin pain medication if needed.  PAIN MEDICATION CAN BE VERY CONSTIPATING.  Take a stool softener or laxative such as senokot, pericolace, or milk of magnesia if needed.    Prescription given for Percocet.  Last pain medication given at ______________.    A follow-up appointment should be arranged with your doctor in 2 weeks; call to schedule.    You should CALL YOUR PHYSICIAN if you develop:  Fever greater than 101 degrees F.  Pain not relieved by medication, or persistent nausea or vomiting.  Excessive bleeding (blood soaking through dressing) or unexpected drainage from the wound.  Extreme redness or swelling around the incision site, drainage of pus or foul smelling drainage.  Inability to urinate or empty your bladder within 8 hours.  Problems with breathing or chest pain.    You should call 911 if you develop problems with breathing or chest pain.  If you are unable to contact your doctor or surgical center, you should go to the nearest emergency room or  urgent care center.  Physician's telephone #: Dr. Palomares [phone]    MILD FLU-LIKE SYMPTOMS ARE NORMAL.  YOU MAY EXPERIENCE GENERALIZED MUSCLE ACHES, THROAT IRRITATION, HEADACHE AND/OR SOME NAUSEA.    If any questions arise, call your doctor.  If your doctor is not available, please feel free to call the Surgical Center at [phone].  The Center is open Monday through Friday from 7AM to 7PM.      A registered nurse may call you a few days after your surgery to see how you are doing after your procedure.    You may also receive a survey in the mail within the next two weeks and we ask that you take a few moments to complete the survey and return it to us.  Our goal is to provide you with very good care and we value your comments.     Depression / Suicide Risk    As you are discharged from this [facility] facility, it is important to learn how to keep safe from harming yourself.    Recognize the warning signs:  Abrupt changes in personality, positive or negative- including increase in energy   Giving away possessions  Change in eating patterns- significant weight changes-  positive or negative  Change in sleeping patterns- unable to sleep or sleeping all the time   Unwillingness or inability to communicate  Depression  Unusual sadness, discouragement and loneliness  Talk of wanting to die  Neglect of personal appearance   Rebelliousness- reckless behavior  Withdrawal from people/activities they love  Confusion- inability to concentrate     If you or a loved one observes any of these behaviors or has concerns about self-harm, here's what you can do:  Talk about it- your feelings and reasons for harming yourself  Remove any means that you might use to hurt yourself (examples: pills, rope, extension cords, firearm)  Get professional help from the community (Mental Health, Substance Abuse, psychological counseling)  Do not be alone:Call your Safe Contact- someone whom you trust who will be there for  you.  Call your local CRISIS HOTLINE 697-4414 or 742-414-9649  Call your local Children's Mobile Crisis Response Team [place] (042) 889-4597 or www.51[employer]  Call the toll free National Suicide Prevention Hotlines   National Suicide Prevention Lifeline 179-831-TBRC (5615)  [facility] Line Network 800-SUICIDE (395-0274)    I acknowledge receipt and understanding of these Home Care instructions.

## 2022-10-13 NOTE — OR NURSING
2016 Report received from MARINA Sagastume.    2051 Report called to bedside New GRAY, all questions and concerns addressed at this time.    2054 Patient transferred to room S338 via wheelchair by RN with all patient belongings and son escorting.

## 2022-10-13 NOTE — CARE PLAN
The patient is Stable - Low risk of patient condition declining or worsening    Shift Goals  Clinical Goals: rest, pain control, VSS    Progress made toward(s) clinical / shift goals:    Problem: Pain - Standard  Goal: Alleviation of pain or a reduction in pain to the patient’s comfort goal  Outcome: Progressing     Problem: Knowledge Deficit - Standard  Goal: Patient and family/care givers will demonstrate understanding of plan of care, disease process/condition, diagnostic tests and medications  Outcome: Progressing     Pt updated on plan of care. Pt encouraged to ask questions and questions were answered. Call light within reach. Pt reminded to use call light whenever needing assistance.   PT O2 sats in mid 90s while on 3L o2 via NC, will continue to monitor and update care team with any changes.     Patient is not progressing towards the following goals: N/A

## 2022-10-13 NOTE — PROGRESS NOTES
Patient has been discharged per order. Patient and family at the bedside has been educated on home care, discharge instructions, and follow up needs. All questions and concerns have been addressed at this time. Patient escorted out via wheelchair.

## 2022-10-13 NOTE — ANESTHESIA POSTPROCEDURE EVALUATION
Patient: So Wood    Procedure Summary     Date: 10/12/22 Room / Location: [facility] ROOM 27 / SURGERY SAME DAY [facility]    Anesthesia Start: 1446 Anesthesia Stop: 1650    Procedures:       TOTAL LAPAROSCOPIC HYSTERECTOMY, BILATERAL SALPINGO-OOPHORECTOMY (Abdomen)      SALPINGO-OOPHORECTOMY (Bilateral: Abdomen) Diagnosis: (ABNORMAL UTERINE BLEEDING, UTERINE FIBROID)    Surgeons: Amrita Palomares M.D. Responsible Provider: Isaac Cuadra M.D.    Anesthesia Type: general ASA Status: 2          Final Anesthesia Type: general  Last vitals  BP   Blood Pressure: 123/69, NIBP: 134/58    Temp   36.1 °C (97 °F)    Pulse   70   Resp   16    SpO2   98 %      Anesthesia Post Evaluation    Patient location during evaluation: PACU  Patient participation: complete - patient participated  Level of consciousness: awake and alert    Airway patency: patent  Anesthetic complications: no  Cardiovascular status: hemodynamically stable  Respiratory status: acceptable  Hydration status: euvolemic    PONV: none          No notable events documented.     Nurse Pain Score: 2 (NPRS)

## 2022-10-27 LAB — PATHOLOGY CONSULT NOTE: NORMAL

## 2023-08-29 NOTE — H&P
Obstetrics & Gynecology History & Physical Note    Date of Service  2022    Chief Complaint  Scheduled for TLH BSO possible lysis of adhesions, posible cystectomy,treatment of endometrisois and all other indictaed procedures.here for pre-op     History of Presenting Illness  So Wood is a 56 y.o.   PMB.    She is being admitted for   Scheduled for TLH BSO possible lysis of adhesions, posible cystectomy,treatment of endometrisois and all other indictaed procedures.    There are no problems to display for this patient.      Obstetric History  OB History    Para Term  AB Living   4 1     2     SAB IAB Ectopic Molar Multiple Live Births     2       1      # Outcome Date GA Lbr Chuck/2nd Weight Sex Delivery Anes PTL Lv   4 Para            3 IAB            2 IAB            1       Vag-Spont          Gynecologic History  Menopausal On and OFF PMB.    Review of Systems  Review of Systems   Constitutional: Negative.    Respiratory: Negative.     Cardiovascular: Negative.    Gastrointestinal: Negative.      Medical History   has a past medical history of Arthritis (2022), High cholesterol, Hyperlipidemia, Hypertension, Sleep apnea, and Thyroid disease.    She has no past medical history of Blood transfusion without reported diagnosis.    Surgical History   has a past surgical history that includes us-needle core bx-breast panel.     Family History  family history includes Hyperlipidemia in her father and mother; Hypertension in her father and mother; Stroke in her mother.     Social History   reports that she quit smoking about 26 years ago. Her smoking use included cigarettes. She has never used smokeless tobacco. She reports current alcohol use. She reports that she does not use drugs.    Allergies  No Known Allergies    Medications  Cannot display prior to admission medications because the patient has not been admitted in this contact.       Physical Exam  Vitals:     "08/17/22 150   Weight: 87 kg (191 lb 12.8 oz)   Height: 1.702 m (5' 7\")       General:   no acute distress, alert, cooperative, no distress   Skin:   normal   HEENT:  PERRLA and extraocular movements intact   Lungs:   CTA bilateral, clear to auscultation bilaterally   Heart:   S1, S2 normal, no murmur, click, rub or gallop, regular rate and rhythm, regular rate and rhythm   Breasts:   self-exam is taught and encouraged, deferred   Abdomen:  Abdomen soft, non-tender; gravid., Abdomen soft, non-tender. BS normal. No masses,  No organomegaly   Pelvis: Exam deferred.                                                     Laboratory:  Prenatal Results    The patient does not have an associated pregnancy episode and working [employer]. Some results will not display without a pregnancy episode and working [employer].   General (Most Recent Result)       Test Value Reference Range Date Time    ABO        Rh        Antibody screen        HbA1c        Chlamydia by PCR        Gonorrhea by PCR        RPR/Syphilus        HSV 1/2 by PCR (non-serum)        HSV 1/2 (serum)        HSV 1        HSV 2        HPV (16)  Negative  Negative 02/20/18 1540    HBsAg        HIV-1 HIV-2 Antibodies        Rubella        Tb                  Pap Smear (Most Recent Result)       Test Value Reference Range Date Time    Pap smear        Pap smear w/HPV  (See Report)    02/20/18 1540    Pap smear w/CTNG        Pap smar w/HPV CTNG        Pap smear (reflex HPV ACUS)        Pap smear (reflex HPV ASCUS w/CTNG)        Pathology gyn specimen  (See Report)    02/20/18 1540              Urinalysis (Most Recent Result)       Test Value Reference Range Date Time    Urinalysis        POC urinalysis        Urine drug screen (w/ conf)        Urine culture ([id number])        Urine Protein/Creatinine Ratio                  Urinalysis, Culture if indicated       Test Value Reference Range Date Time    Color  Yellow   08/17/22 1548    Appearance  Clear   08/17/22 1548    Specific " Gravity  1.007  <1.035 22 1548    PH  6.5  5.0 - 8.0 22 1548    Glucose  Negative mg/dL Negative 22 1548    Ketones  Negative mg/dL Negative 22 1548    Protein  Negative mg/dL Negative 22 1548    Bilirubin  Negative  Negative 22 1548    Nitrites  Negative  Negative 22 1548    Leukocytes Esterase  Trace  Negative 22 1548    Blood  Negative  Negative 22 1548    Comment  Microscopic   22 1548    Culture                  Urine Drug Screen       Test Value Reference Range Date Time    Amphetamines        Barbiturates        Benzodiazepines        Cocaine        Methadone        Opiates        Oxycodone        Phencylidine        Propoxyphene        Marijuana Metabolite                  1st Trimester       Test Value Reference Range Date Time    Hgb        Hct        Fasting Glucose Tolerance        GTT, 1 hour        GTT, 2 hours        GTT, 3 hours                  2nd Trimester       Test Value Reference Range Date Time    Hgb        Hct        AST        ALT        Uric Acid        Fasting Glucose Tolerance        GTT, 1 hour        GTT, 2 hours        GTT, 3 hours                  3rd Trimester       Test Value Reference Range Date Time    Hgb        Hct        Platelet count        GBS (JIMENEZ BROTH)        Fasting Glucose Tolerance        GTT, 1 hour        GTT, 2 hous        GTT, 3hours                  Congenital Disease Screening       Test Value Reference Range Date Time    First Trimester Screen        Quad Screen        BH Electrophoresis        Cystic Fibrosis Carrier Study        SMA        AFP Maternal Serum         AFP Tetra        NIPT                  Legend    ^: Historical                            Assessment:   57 y/o G 3P 1021      x 1     adopted another son.    PMB /PCB. on and off.     pelvic pain and discomfort on and off.    pelvic ultrasound- multiple intramural fibroids.     chocolate cyst on ovary 3.5cms. postmenopausal.     EMB-  [place]      discussed options for AUB / Fibroids and possible endometriosis.     TLH and BSO lysis of adhesions and treatment of endometriosis.     pt wants to go ahed with surgery     explained the procedure and risks and benefits,     no gaurantee of pain relief after surgery.     Plan:  Scheduled for TLH BSO possible lysis of adhesions, posible cystectomy,treatment of endometrisois and all other indictaed procedures.    Discussed with the patient indications for TLH and BSO  . The patient voiced understanding of indications for surgery  at this time.   Discussed with the patient the risks of Surgery . The risks include infection, bleeding, scarring, damage to other organs in the area of operation. Specifically organs that can be damaged are bowel, bladder, ureters. I also discussed with the patient the risk of wound infection and wound breakdown. We discussed that these risks are greater in people with high risk factors I also discussed the risk of emergency blood transfusion during procedure .   Patient had the opportunity to ask questions regarding procedures. All questions answered to the patient's satisfaction.   Proceed with Surgery    Preopinstructions and post op recovery explained.    Amrita Palomares M.D.     PAIN SCALE 5 OF 10.

## (undated) DEVICE — LIGASURE 5MM BLUNT TIP LONG - 44CM (6EA/PK)

## (undated) DEVICE — TUBE CONNECTING SUCTION - CLEAR PLASTIC STERILE 72 IN (50EA/CA)

## (undated) DEVICE — DRAPE VAGINAL BIB W/ POUCH (10EA/CA)

## (undated) DEVICE — TROCAR 5X100 BLADED ADVANCE - FIXATION (6/BX)

## (undated) DEVICE — WATER IRRIGATION STERILE 1000ML (12EA/CA)

## (undated) DEVICE — PENCIL ELECTSURG 10FT BTN SWH - (50/CA)

## (undated) DEVICE — TUBING CLEARLINK DUO-VENT - C-FLO (48EA/CA)

## (undated) DEVICE — NEEDLE INSFL 120MM 14GA VRRS - (20/BX)

## (undated) DEVICE — TRAY FOLEY CATHETER STATLOCK 16FR SURESTEP  (10EA/CA)

## (undated) DEVICE — CANNULA W/ SUPPLY TUBING O2 - (50/CA)

## (undated) DEVICE — Device

## (undated) DEVICE — SUTURE 1 STRATAFIX SYMMETRIC PDS PLUS CT-1 45CM (12EA/BX)

## (undated) DEVICE — CANISTER SUCTION RIGID RED 1500CC (40EA/CA)

## (undated) DEVICE — KIT  I.V. START (100EA/CA)

## (undated) DEVICE — SUTURE 0 COATED VICRYL 6-18IN - (12PK/BX)

## (undated) DEVICE — TROCAR Z THREAD 11 X 100 - BLADED (6/BX)

## (undated) DEVICE — LACTATED RINGERS INJ 1000 ML - (14EA/CA 60CA/PF)

## (undated) DEVICE — TOWEL STOP TIMEOUT SAFETY FLAG (40EA/CA)

## (undated) DEVICE — GVL 3 STAT DISPOSABLE - (10/BX)

## (undated) DEVICE — SODIUM CHL IRRIGATION 0.9% 1000ML (12EA/CA)

## (undated) DEVICE — TUBING LAPAROSCOPIC PLUME DEVICE (10EA/CA)

## (undated) DEVICE — DRAPE STRLE REG TOWEL 18X24 - (10/BX 4BX/CA)"

## (undated) DEVICE — GLOVE BIOGEL INDICATOR SZ 7SURGICAL PF LTX - (50/BX 4BX/CA)

## (undated) DEVICE — SLEEVE VASO CALF MED - (10PR/CA)

## (undated) DEVICE — SUTURE GENERAL

## (undated) DEVICE — MASK OXYGEN VNYL ADLT MED CONC WITH 7 FOOT TUBING  - (50EA/CA)

## (undated) DEVICE — GOWN WARMING STANDARD FLEX - (30/CA)

## (undated) DEVICE — UTERINE MANIP V-CARE STANDARD DAVINCI (8EA/CA)

## (undated) DEVICE — SET LEADWIRE 5 LEAD BEDSIDE DISPOSABLE ECG (1SET OF 5/EA)

## (undated) DEVICE — SET IRRIGATION CYSTOSCOPY TUBE L80 IN (20EA/CA)

## (undated) DEVICE — SPONGE GAUZESTER. 2X2 4-PL - (2/PK 50PK/BX 30BX/CS)

## (undated) DEVICE — SUTURE 0 VICRYL PLUS CT-2 - 27 INCH (36/BX)

## (undated) DEVICE — JELLY SURGILUBE STERILE TUBE 4.25 OZ (1/EA)

## (undated) DEVICE — PAD SANITARY 11IN MAXI IND WRAPPED  (12EA/PK 24PK/CA)

## (undated) DEVICE — SENSOR OXIMETER ADULT SPO2 RD SET (20EA/BX)

## (undated) DEVICE — CANISTER SUCTION 3000ML MECHANICAL FILTER AUTO SHUTOFF MEDI-VAC NONSTERILE LF DISP  (40EA/CA)

## (undated) DEVICE — SUCTION INSTRUMENT YANKAUER BULBOUS TIP W/O VENT (50EA/CA)

## (undated) DEVICE — GLOVE SZ 7 BIOGEL PI MICRO - PF LF (50PR/BX 4BX/CA)

## (undated) DEVICE — SUTURE 4-0 MONOCRYL PLUS PS-2 - 27 INCH (36/BX)

## (undated) DEVICE — DRESSING TRANSPARENT FILM TEGADERM 2.375 X 2.75"  (100EA/BX)"

## (undated) DEVICE — DRAPESURG STERI-DRAPE LONG - (10/BX 4BX/CA)

## (undated) DEVICE — SET SUCTION/IRRIGATION WITH DISPOSABLE TIP (6/CA )PART #0250-070-520 IS A SUB